# Patient Record
Sex: MALE | Race: WHITE | NOT HISPANIC OR LATINO | Employment: OTHER | URBAN - METROPOLITAN AREA
[De-identification: names, ages, dates, MRNs, and addresses within clinical notes are randomized per-mention and may not be internally consistent; named-entity substitution may affect disease eponyms.]

---

## 2023-09-14 DIAGNOSIS — M10.9 ACUTE GOUT, UNSPECIFIED CAUSE, UNSPECIFIED SITE: ICD-10-CM

## 2023-09-14 DIAGNOSIS — K21.9 GASTROESOPHAGEAL REFLUX DISEASE WITHOUT ESOPHAGITIS: Primary | ICD-10-CM

## 2023-09-14 RX ORDER — OMEPRAZOLE 20 MG/1
20 CAPSULE, DELAYED RELEASE ORAL DAILY
Qty: 90 CAPSULE | Refills: 3 | Status: SHIPPED | OUTPATIENT
Start: 2023-09-14 | End: 2024-09-13

## 2023-09-14 RX ORDER — INDOMETHACIN 50 MG/1
50 CAPSULE ORAL 3 TIMES DAILY PRN
COMMUNITY
Start: 2017-06-24 | End: 2023-09-14 | Stop reason: SDUPTHER

## 2023-09-14 RX ORDER — INDOMETHACIN 50 MG/1
50 CAPSULE ORAL 3 TIMES DAILY PRN
Qty: 30 CAPSULE | Refills: 3 | Status: SHIPPED | OUTPATIENT
Start: 2023-09-14 | End: 2023-10-03

## 2023-09-14 RX ORDER — OMEPRAZOLE 20 MG/1
20 CAPSULE, DELAYED RELEASE ORAL DAILY
COMMUNITY
Start: 2022-03-10 | End: 2023-09-14 | Stop reason: SDUPTHER

## 2023-09-27 ENCOUNTER — PATIENT OUTREACH (OUTPATIENT)
Dept: PRIMARY CARE | Facility: CLINIC | Age: 83
End: 2023-09-27
Payer: MEDICARE

## 2023-09-27 DIAGNOSIS — I21.4 NSTEMI (NON-ST ELEVATED MYOCARDIAL INFARCTION) (MULTI): ICD-10-CM

## 2023-09-27 NOTE — PROGRESS NOTES
Discharge Facility: [facility]     Discharge Diagnosis: NSTEMI  (left heart catheterization-  1 stent placed by interventional cardiologist )    Admission Date: 9/23/23  Discharge Date: 9/26/23    PCP Appointment Date:  TBD-I am unable to make an appt due to no openings . Message sent to office staff requesting assistance. As well as encourged patient to call today to schedule.     Patient would also like a Flu vaccine when in office. I advised him to let them know when he calls to schedule appt.     Specialist Appointment Date:   10/20/23 outpatient echocardiogram at [facility]     11/21/23   Bia Romero APRN.CNP  Cardio  NPI: [id number]  [address]  [facility] [address]   Phone: [phone]    Hospital Encounter and Summary: Linked   See discharge assessment below for further details  I introduced myself and the TCM program to Niko Harris. I gave my contact information for any further questions.  Engagement  Call Start Time: 1116 (9/27/2023 11:16 AM)    Medications  Medications reviewed with patient/caregiver?: Yes (9/27/2023 11:16 AM)  Is the patient having any side effects they believe may be caused by any medication additions or changes?: No (9/27/2023 11:16 AM)  Does the patient have all medications ordered at discharge?: Yes (recieved RX for Brilinta at hospital due to cheaper then retail pharmacy) (9/27/2023 11:16 AM)  Care Management Interventions: Provided patient education (9/27/2023 11:16 AM)  Prescription Comments: per patient same meds& dose --aspirin, enteric coated 81 mg EC tablet  -atorvastatin 80 mg tablet  Take 1 tablet by mouth once daily.  -empagliflozin 10 mg tablet  Take 1 tablet by mouth once daily.  -melatonin 3 mg tablet  -metoprolol succinate ER 25 mg 24 hr tablet  Take 0.5 tablets by mouth once daily.  -Multivitamin capsule-omega-3 fatty acids 1,000 mg Cap  Take 1 capsule by mouth twice daily-  omeprazole 20 mg capsule-  sacubitril-valsartan 24-26 mg  tablet  Take 1 tablet by mouth twice daily.-  ticagrelor 90 mg tablet  Take 1 tablet by mouth twice daily. (9/27/2023 11:16 AM)  Is the patient taking all medications as directed (includes completed medication regime)?: Yes (9/27/2023 11:16 AM)  Medication Comments: discussed trying [facility] to compare prices (9/27/2023 11:16 AM)    Appointments  Does the patient have a primary care provider?: Yes (Pranay Kendall) (9/27/2023 11:16 AM)  Care Management Interventions: Educated patient on importance of making appointment (9/27/2023 11:16 AM)  Has the patient kept scheduled appointments due by today?: Yes (9/27/2023 11:16 AM)  Care Management Interventions: Educated on importance of keeping appointment (9/27/2023 11:16 AM)    Self Management  Has home health visited the patient within 72 hours of discharge?: Not applicable (9/27/2023 11:16 AM)    Patient Teaching  Does the patient have access to their discharge instructions?: Yes (9/27/2023 11:16 AM)  Care Management Interventions: Reviewed instructions with patient (9/27/2023 11:16 AM)  What is the patient's perception of their health status since discharge?: Improving (9/27/2023 11:16 AM)  Is the patient/caregiver able to teach back the hierarchy of who to call/visit for symptoms/problems? PCP, Specialist, Home Health nurse, Urgent Care, ED, 911: Yes (9/27/2023 11:16 AM)    Wrap Up  Call End Time: 1121 (9/27/2023 11:16 AM)

## 2023-10-03 ENCOUNTER — ANCILLARY PROCEDURE (OUTPATIENT)
Dept: RADIOLOGY | Facility: CLINIC | Age: 83
End: 2023-10-03
Payer: MEDICARE

## 2023-10-03 ENCOUNTER — OFFICE VISIT (OUTPATIENT)
Dept: PRIMARY CARE | Facility: CLINIC | Age: 83
End: 2023-10-03
Payer: MEDICARE

## 2023-10-03 VITALS
DIASTOLIC BLOOD PRESSURE: 72 MMHG | BODY MASS INDEX: 30.82 KG/M2 | HEART RATE: 63 BPM | OXYGEN SATURATION: 95 % | WEIGHT: 185 LBS | HEIGHT: 65 IN | SYSTOLIC BLOOD PRESSURE: 134 MMHG

## 2023-10-03 DIAGNOSIS — I21.4 NSTEMI (NON-ST ELEVATED MYOCARDIAL INFARCTION) (MULTI): ICD-10-CM

## 2023-10-03 DIAGNOSIS — M54.50 CHRONIC RIGHT-SIDED LOW BACK PAIN WITHOUT SCIATICA: ICD-10-CM

## 2023-10-03 DIAGNOSIS — M54.50 CHRONIC RIGHT-SIDED LOW BACK PAIN WITHOUT SCIATICA: Primary | ICD-10-CM

## 2023-10-03 DIAGNOSIS — G89.29 CHRONIC RIGHT-SIDED LOW BACK PAIN WITHOUT SCIATICA: Primary | ICD-10-CM

## 2023-10-03 DIAGNOSIS — G89.29 CHRONIC RIGHT-SIDED LOW BACK PAIN WITHOUT SCIATICA: ICD-10-CM

## 2023-10-03 PROCEDURE — 72110 X-RAY EXAM L-2 SPINE 4/>VWS: CPT | Mod: FY

## 2023-10-03 PROCEDURE — 1126F AMNT PAIN NOTED NONE PRSNT: CPT | Performed by: FAMILY MEDICINE

## 2023-10-03 PROCEDURE — 72110 X-RAY EXAM L-2 SPINE 4/>VWS: CPT | Performed by: RADIOLOGY

## 2023-10-03 PROCEDURE — 1159F MED LIST DOCD IN RCRD: CPT | Performed by: FAMILY MEDICINE

## 2023-10-03 PROCEDURE — 99495 TRANSJ CARE MGMT MOD F2F 14D: CPT | Performed by: FAMILY MEDICINE

## 2023-10-03 PROCEDURE — 1036F TOBACCO NON-USER: CPT | Performed by: FAMILY MEDICINE

## 2023-10-03 RX ORDER — SACUBITRIL AND VALSARTAN 24; 26 MG/1; MG/1
1 TABLET, FILM COATED ORAL 2 TIMES DAILY
COMMUNITY
Start: 2023-02-07

## 2023-10-03 RX ORDER — EMPAGLIFLOZIN 10 MG/1
10 TABLET, FILM COATED ORAL DAILY
COMMUNITY
Start: 2023-02-06

## 2023-10-03 RX ORDER — ASPIRIN 81 MG/1
1 TABLET ORAL DAILY
COMMUNITY
Start: 2022-03-10

## 2023-10-03 RX ORDER — OMEGA-3 FATTY ACIDS 1000 MG
1000 CAPSULE ORAL 2 TIMES DAILY
COMMUNITY
Start: 2022-11-08

## 2023-10-03 RX ORDER — ATORVASTATIN CALCIUM 80 MG/1
80 TABLET, FILM COATED ORAL DAILY
COMMUNITY
Start: 2022-12-20

## 2023-10-03 RX ORDER — TAMSULOSIN HYDROCHLORIDE 0.4 MG/1
1 CAPSULE ORAL DAILY
COMMUNITY
Start: 2022-03-10 | End: 2023-12-08 | Stop reason: WASHOUT

## 2023-10-03 ASSESSMENT — ENCOUNTER SYMPTOMS: DEPRESSION: 0

## 2023-10-03 ASSESSMENT — PAIN SCALES - GENERAL: PAINLEVEL: 0-NO PAIN

## 2023-10-03 NOTE — PROGRESS NOTES
"Subjective   Patient ID: Niko Harris is a 83 y.o. male who presents for Hospital Follow-up (Hospital follow up for heart attack 10/23/2023. ).    HPI patient here for follow-up after heart attack.  He suffered NSTEMI with ultimate catheterization and stent placement.  He is doing well.  He feels better.  Patient does complain of continued low back pain in the predominantly right lower back region.  He has a history of surgery over 1 year ago.  Review of Systems    Objective   /72 (BP Location: Left arm, Patient Position: Sitting, BP Cuff Size: Adult)   Pulse 63   Ht 1.651 m (5' 5\")   Wt 83.9 kg (185 lb)   SpO2 95%   BMI 30.79 kg/m²     Physical Exam  Alert, pleasant and in no acute distress.  Heart: Regular rate and rhythm without murmur  Lungs: Clear to auscultation  Lower extremities: No edema  Assessment/Plan     Patient here for follow-up after NSTEMI and stent placement.  He is doing well.  Follow with cardiology next month  Patient has chronic low back pain since his surgery.  We will have him consult with neurosurgery.  We will get x-rays of the low back to update things.  Encourage patient to perform daily exercises.  Would like to see him walking on a daily basis to slowly build back his endurance.     "

## 2023-10-12 ENCOUNTER — PATIENT OUTREACH (OUTPATIENT)
Dept: PRIMARY CARE | Facility: CLINIC | Age: 83
End: 2023-10-12
Payer: MEDICARE

## 2023-10-19 ENCOUNTER — APPOINTMENT (OUTPATIENT)
Dept: PRIMARY CARE | Facility: CLINIC | Age: 83
End: 2023-10-19
Payer: MEDICARE

## 2023-11-15 ENCOUNTER — PATIENT OUTREACH (OUTPATIENT)
Dept: PRIMARY CARE | Facility: CLINIC | Age: 83
End: 2023-11-15
Payer: MEDICARE

## 2023-11-15 NOTE — PROGRESS NOTES
Unable to reach patient for one month post discharge follow up call.   LVM with call back number for patient to call if needed  to assist with any questions or concerns patient may have.

## 2023-12-04 PROBLEM — M51.36 DDD (DEGENERATIVE DISC DISEASE), LUMBAR: Status: ACTIVE | Noted: 2019-06-26

## 2023-12-04 PROBLEM — M10.9 ACUTE GOUT: Status: ACTIVE | Noted: 2023-12-04

## 2023-12-04 PROBLEM — Z95.1 HX OF CABG: Status: ACTIVE | Noted: 2017-06-09

## 2023-12-04 PROBLEM — R06.09 DYSPNEA ON EXERTION: Status: ACTIVE | Noted: 2023-09-24

## 2023-12-04 PROBLEM — I25.10 CAD, MULTIPLE VESSEL: Status: ACTIVE | Noted: 2023-12-04

## 2023-12-04 PROBLEM — L30.8 HERPES ZOSTER DERMATITIS: Status: ACTIVE | Noted: 2023-12-04

## 2023-12-04 PROBLEM — E66.811 OBESITY, CLASS I, BMI 30-34.9: Status: ACTIVE | Noted: 2023-09-23

## 2023-12-04 PROBLEM — K21.9 GASTROESOPHAGEAL REFLUX DISEASE: Status: ACTIVE | Noted: 2023-12-04

## 2023-12-04 PROBLEM — B02.8 HERPES ZOSTER DERMATITIS: Status: ACTIVE | Noted: 2023-12-04

## 2023-12-04 PROBLEM — N17.9 AKI (ACUTE KIDNEY INJURY) (CMS-HCC): Status: ACTIVE | Noted: 2022-10-03

## 2023-12-04 PROBLEM — I21.4 NSTEMI (NON-ST ELEVATED MYOCARDIAL INFARCTION) (MULTI): Status: ACTIVE | Noted: 2023-09-23

## 2023-12-04 PROBLEM — R07.9 CHEST PAIN: Status: ACTIVE | Noted: 2023-10-20

## 2023-12-04 PROBLEM — J06.9 ACUTE UPPER RESPIRATORY INFECTION: Status: ACTIVE | Noted: 2023-12-04

## 2023-12-04 PROBLEM — E78.00 HYPERCHOLESTEROLEMIA: Status: ACTIVE | Noted: 2023-12-04

## 2023-12-04 PROBLEM — I50.9 CHF (CONGESTIVE HEART FAILURE) (MULTI): Status: ACTIVE | Noted: 2023-03-01

## 2023-12-04 PROBLEM — I25.10 CORONARY ARTERY DISEASE INVOLVING NATIVE CORONARY ARTERY OF NATIVE HEART WITHOUT ANGINA PECTORIS: Status: ACTIVE | Noted: 2017-06-09

## 2023-12-04 PROBLEM — R57.9 SHOCK (MULTI): Status: ACTIVE | Noted: 2022-10-03

## 2023-12-04 PROBLEM — I10 BENIGN ESSENTIAL HYPERTENSION: Status: ACTIVE | Noted: 2018-11-26

## 2023-12-04 PROBLEM — M51.369 DDD (DEGENERATIVE DISC DISEASE), LUMBAR: Status: ACTIVE | Noted: 2019-06-26

## 2023-12-04 PROBLEM — J12.82 PNEUMONIA DUE TO COVID-19 VIRUS: Status: ACTIVE | Noted: 2022-10-03

## 2023-12-04 PROBLEM — C61 MALIGNANT NEOPLASM OF PROSTATE (MULTI): Status: ACTIVE | Noted: 2020-10-30

## 2023-12-04 PROBLEM — E78.49 OTHER HYPERLIPIDEMIA: Status: ACTIVE | Noted: 2018-11-26

## 2023-12-04 PROBLEM — M79.2 NEUROPATHIC PAIN: Status: ACTIVE | Noted: 2019-06-26

## 2023-12-04 PROBLEM — J32.9 SINUSITIS: Status: ACTIVE | Noted: 2023-12-04

## 2023-12-04 PROBLEM — E66.9 OBESITY, CLASS I, BMI 30-34.9: Status: ACTIVE | Noted: 2023-09-23

## 2023-12-04 PROBLEM — U07.1 PNEUMONIA DUE TO COVID-19 VIRUS: Status: ACTIVE | Noted: 2022-10-03

## 2023-12-04 PROBLEM — J20.9 ACUTE BRONCHITIS: Status: ACTIVE | Noted: 2023-12-04

## 2023-12-04 PROBLEM — J96.01 ACUTE RESPIRATORY FAILURE WITH HYPOXIA (MULTI): Status: ACTIVE | Noted: 2022-10-03

## 2023-12-07 RX ORDER — METOPROLOL SUCCINATE 25 MG/1
25 TABLET, EXTENDED RELEASE ORAL
COMMUNITY
Start: 2023-12-04

## 2023-12-08 ENCOUNTER — OFFICE VISIT (OUTPATIENT)
Dept: NEUROSURGERY | Facility: CLINIC | Age: 83
End: 2023-12-08
Payer: MEDICARE

## 2023-12-08 VITALS
DIASTOLIC BLOOD PRESSURE: 76 MMHG | SYSTOLIC BLOOD PRESSURE: 152 MMHG | HEART RATE: 95 BPM | TEMPERATURE: 97.3 F | HEIGHT: 64 IN | WEIGHT: 180 LBS | BODY MASS INDEX: 30.73 KG/M2

## 2023-12-08 DIAGNOSIS — B02.9 ACUTE PAIN ASSOCIATED WITH HERPES ZOSTER: ICD-10-CM

## 2023-12-08 DIAGNOSIS — M40.05 POSTURAL KYPHOSIS OF LUMBAR REGION: Primary | ICD-10-CM

## 2023-12-08 DIAGNOSIS — M81.0 AGE-RELATED OSTEOPOROSIS WITHOUT CURRENT PATHOLOGICAL FRACTURE: ICD-10-CM

## 2023-12-08 DIAGNOSIS — M41.9 SCOLIOSIS OF THORACOLUMBAR SPINE, UNSPECIFIED SCOLIOSIS TYPE: ICD-10-CM

## 2023-12-08 PROCEDURE — 1159F MED LIST DOCD IN RCRD: CPT | Performed by: NURSE PRACTITIONER

## 2023-12-08 PROCEDURE — 3077F SYST BP >= 140 MM HG: CPT | Performed by: NURSE PRACTITIONER

## 2023-12-08 PROCEDURE — 3078F DIAST BP <80 MM HG: CPT | Performed by: NURSE PRACTITIONER

## 2023-12-08 PROCEDURE — 1160F RVW MEDS BY RX/DR IN RCRD: CPT | Performed by: NURSE PRACTITIONER

## 2023-12-08 PROCEDURE — 1125F AMNT PAIN NOTED PAIN PRSNT: CPT | Performed by: NURSE PRACTITIONER

## 2023-12-08 PROCEDURE — 99203 OFFICE O/P NEW LOW 30 MIN: CPT | Performed by: NURSE PRACTITIONER

## 2023-12-08 PROCEDURE — 1036F TOBACCO NON-USER: CPT | Performed by: NURSE PRACTITIONER

## 2023-12-08 RX ORDER — GLUCOSAM/CHONDRO/HERB 149/HYAL 750-100 MG
TABLET ORAL
COMMUNITY

## 2023-12-08 RX ORDER — MELATONIN 3 MG
CAPSULE ORAL
COMMUNITY

## 2023-12-08 RX ORDER — GABAPENTIN 100 MG/1
CAPSULE ORAL
Qty: 90 CAPSULE | Refills: 0 | Status: SHIPPED | OUTPATIENT
Start: 2023-12-08 | End: 2024-01-23

## 2023-12-08 ASSESSMENT — ENCOUNTER SYMPTOMS: OCCASIONAL FEELINGS OF UNSTEADINESS: 0

## 2023-12-08 ASSESSMENT — PATIENT HEALTH QUESTIONNAIRE - PHQ9
1. LITTLE INTEREST OR PLEASURE IN DOING THINGS: NOT AT ALL
SUM OF ALL RESPONSES TO PHQ9 QUESTIONS 1 AND 2: 0
2. FEELING DOWN, DEPRESSED OR HOPELESS: NOT AT ALL

## 2023-12-08 ASSESSMENT — PAIN SCALES - GENERAL: PAINLEVEL: 7

## 2023-12-08 NOTE — PROGRESS NOTES
It was a pleasure to see Niko Harris on 12/8/2023. He is an 83 y.o. year old, male who presents to the [facility] for evaluation of low back pain. Patient is referred by Pranay Kendall DO. PMH is significant for HTN / HPL, CHF, CAD (s/p CABG), NSTEMI (s/p stent placement), h/o prostate and testicular cancer, DM2    Niko Harris has had symptoms of LBP on the right for many years without inciting event. He has had previous lumbar decompression by [facility] (2021) with  improvement of pre-op symptoms, but continuation of current back pain. Progression of symptoms prompted today's visit. Symptoms are worsened with sitting in hard backed chair. Thus far, patient has tried activity modification with little to no improvement of symptoms. He denies change in bowel / bladder function, saddle anesthesia, imbalance, falls, difficulty dressing, difficulty holding / opening objects. He notes that he has lost 3 inches in height since young adulthood.    XR LUMBAR SPINE 4 views: on 10/03/2023:  FINDINGS:  No acute fracture. Severe multilevel spondylosis. Multilevel disc  space narrowing. Multilevel facet arthropathy. Multilevel anterior  osteophyte formation. Straightening of the normal lumbar lordosis, likely related to patient positioning or spasm. Dextro-convex curvature of the lumbar spine, apex at L3.  IMPRESSION:  Severe multilevel spondylosis of the lumbar spine without acute  osseous abnormality detected.  Signed by: Heron Diop    PREVIOUS TREATMENTS  Topical    Previous Spine Surgery:   2021 Lumbar surgery at [facility]: Dr Dumas who has retired     Smoker: Remote   Anticoagulation / Antiplatelets: YES: Daily ASA, Brilinta     ROS: 12 / 12 systems reviewed and are negative unless noted in HPI    ON EXAM:  General: Well developed, awake/alert/oriented x 3, no distress, alert and cooperative  Skin: Warm and dry, no visible lesions / rashes  ENMT: Mucous membranes  moist, no apparent injury  Head/Neck: No apparent injury  Respiratory/Thorax: Normal breathing with good chest expansion, thorax symmetric  Cardiovascular: No pitting edema, no JVD  Gastrointestinal: Non-distended  NEUROLOGICAL EXAM:  EOMI, face symmetric  Motor Strength: 5/5 BLE  Muscle Tone: Normal without spasticity or contractures in all extremities  Muscle Bulk: Normal and symmetric in all extremities  Posture: Sits in chair with leftward tilt  -- Cervical: Normal  -- Thoracic: thoracolumbar dextroscoliosis  -- Lumbar : kyphosis  Paraspinal muscle spasm/tenderness absent.  No palpable tenderness along the spinous processes.  Sensation: SILT in BLE  Gait: Normal    Niko Harris has thoracolumbar scoliosis and lumbar kyphosis. We reviewed lumbar x-rays from 10/2023, and discussed rationale for DEXA BONE DENSITY, given 3 inch height loss. Discussed extensive surgical intervention for kyphoscoliosis of thoracolumbar spine. Will await DEXA results prior to additional imaging. He verbalizes understanding and agreement with plan. He also reports that he had shingles in right upper lumbar dermatome with continued pain in anterior thigh. Discussed gabapentin dosing, possible side effects. He wishes to proceed with dosing for shingles pain.  TOTAL TIME SPENT:   Time preparing / completing chart 7 MIN  Time spent face to face with patient, including counseling > 25 MIN  BAM Vazquez-CNP

## 2023-12-18 ENCOUNTER — APPOINTMENT (OUTPATIENT)
Dept: RADIOLOGY | Facility: CLINIC | Age: 83
End: 2023-12-18
Payer: MEDICARE

## 2023-12-19 ENCOUNTER — PATIENT OUTREACH (OUTPATIENT)
Dept: PRIMARY CARE | Facility: CLINIC | Age: 83
End: 2023-12-19
Payer: MEDICARE

## 2023-12-19 DIAGNOSIS — J11.1 INFLUENZA: Primary | ICD-10-CM

## 2023-12-19 RX ORDER — OSELTAMIVIR PHOSPHATE 75 MG/1
75 CAPSULE ORAL 2 TIMES DAILY
Qty: 10 CAPSULE | Refills: 0 | Status: SHIPPED | OUTPATIENT
Start: 2023-12-19 | End: 2023-12-24

## 2023-12-22 ENCOUNTER — ANCILLARY PROCEDURE (OUTPATIENT)
Dept: RADIOLOGY | Facility: CLINIC | Age: 83
End: 2023-12-22
Payer: MEDICARE

## 2023-12-22 DIAGNOSIS — M81.0 AGE-RELATED OSTEOPOROSIS WITHOUT CURRENT PATHOLOGICAL FRACTURE: ICD-10-CM

## 2023-12-22 DIAGNOSIS — M40.05 POSTURAL KYPHOSIS OF LUMBAR REGION: ICD-10-CM

## 2023-12-22 DIAGNOSIS — M41.9 SCOLIOSIS OF THORACOLUMBAR SPINE, UNSPECIFIED SCOLIOSIS TYPE: ICD-10-CM

## 2023-12-22 PROCEDURE — 77080 DXA BONE DENSITY AXIAL: CPT | Performed by: RADIOLOGY

## 2023-12-22 PROCEDURE — 77080 DXA BONE DENSITY AXIAL: CPT

## 2024-01-16 ENCOUNTER — APPOINTMENT (OUTPATIENT)
Dept: NEUROSURGERY | Facility: CLINIC | Age: 84
End: 2024-01-16
Payer: MEDICARE

## 2024-03-06 DIAGNOSIS — B02.9 ACUTE PAIN ASSOCIATED WITH HERPES ZOSTER: ICD-10-CM

## 2024-03-06 RX ORDER — GABAPENTIN 100 MG/1
CAPSULE ORAL
Qty: 90 CAPSULE | Refills: 0 | OUTPATIENT
Start: 2024-03-06

## 2024-03-06 NOTE — TELEPHONE ENCOUNTER
This was prescribed for shingles pain. If he wants a refill, he will need to discuss with his PCP. Thank you!  BAM Vazquez-CNP

## 2024-03-08 ENCOUNTER — TELEPHONE (OUTPATIENT)
Dept: NEUROSURGERY | Facility: CLINIC | Age: 84
End: 2024-03-08
Payer: MEDICARE

## 2024-03-08 DIAGNOSIS — B02.9 ACUTE PAIN ASSOCIATED WITH HERPES ZOSTER: ICD-10-CM

## 2024-03-08 RX ORDER — GABAPENTIN 100 MG/1
CAPSULE ORAL
Qty: 90 CAPSULE | Refills: 1 | Status: SHIPPED | OUTPATIENT
Start: 2024-03-08 | End: 2024-04-25 | Stop reason: SDUPTHER

## 2024-04-25 DIAGNOSIS — B02.9 ACUTE PAIN ASSOCIATED WITH HERPES ZOSTER: ICD-10-CM

## 2024-04-25 RX ORDER — GABAPENTIN 100 MG/1
CAPSULE ORAL
Qty: 90 CAPSULE | Refills: 1 | Status: SHIPPED | OUTPATIENT
Start: 2024-04-25 | End: 2024-06-06 | Stop reason: SDUPTHER

## 2024-05-01 DIAGNOSIS — M54.50 CHRONIC RIGHT-SIDED LOW BACK PAIN WITHOUT SCIATICA: Primary | ICD-10-CM

## 2024-05-01 DIAGNOSIS — G89.29 CHRONIC RIGHT-SIDED LOW BACK PAIN WITHOUT SCIATICA: Primary | ICD-10-CM

## 2024-06-06 DIAGNOSIS — B02.9 ACUTE PAIN ASSOCIATED WITH HERPES ZOSTER: ICD-10-CM

## 2024-06-06 RX ORDER — GABAPENTIN 100 MG/1
CAPSULE ORAL
Qty: 90 CAPSULE | Refills: 1 | Status: SHIPPED | OUTPATIENT
Start: 2024-06-06

## 2024-10-04 DIAGNOSIS — B02.9 ACUTE PAIN ASSOCIATED WITH HERPES ZOSTER: ICD-10-CM

## 2024-10-04 RX ORDER — GABAPENTIN 100 MG/1
CAPSULE ORAL
Qty: 90 CAPSULE | Refills: 2 | Status: SHIPPED | OUTPATIENT
Start: 2024-10-04

## 2024-11-14 DIAGNOSIS — K21.9 GASTROESOPHAGEAL REFLUX DISEASE WITHOUT ESOPHAGITIS: ICD-10-CM

## 2024-11-14 RX ORDER — OMEPRAZOLE 20 MG/1
20 CAPSULE, DELAYED RELEASE ORAL DAILY
Qty: 90 CAPSULE | Refills: 3 | Status: SHIPPED | OUTPATIENT
Start: 2024-11-14 | End: 2025-11-14